# Patient Record
Sex: MALE | URBAN - METROPOLITAN AREA
[De-identification: names, ages, dates, MRNs, and addresses within clinical notes are randomized per-mention and may not be internally consistent; named-entity substitution may affect disease eponyms.]

---

## 2024-04-25 ENCOUNTER — EMERGENCY (EMERGENCY)
Facility: HOSPITAL | Age: 54
LOS: 1 days | Discharge: HOME | End: 2024-04-25
Admitting: EMERGENCY MEDICINE
Payer: COMMERCIAL

## 2024-04-25 VITALS
RESPIRATION RATE: 18 BRPM | SYSTOLIC BLOOD PRESSURE: 158 MMHG | HEART RATE: 97 BPM | TEMPERATURE: 98 F | WEIGHT: 205.91 LBS | OXYGEN SATURATION: 98 % | DIASTOLIC BLOOD PRESSURE: 107 MMHG
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
## 2024-04-25 DIAGNOSIS — M54.2 CERVICALGIA: ICD-10-CM

## 2024-04-25 PROCEDURE — 96372 THER/PROPH/DIAG INJ SC/IM: CPT

## 2024-04-25 PROCEDURE — 99284 EMERGENCY DEPT VISIT MOD MDM: CPT

## 2024-04-25 PROCEDURE — 72125 CT NECK SPINE W/O DYE: CPT | Mod: MC

## 2024-04-25 PROCEDURE — 70450 CT HEAD/BRAIN W/O DYE: CPT | Mod: 26,MC

## 2024-04-25 PROCEDURE — 72125 CT NECK SPINE W/O DYE: CPT | Mod: 26,MC

## 2024-04-25 PROCEDURE — 99284 EMERGENCY DEPT VISIT MOD MDM: CPT | Mod: 25

## 2024-04-25 PROCEDURE — 70450 CT HEAD/BRAIN W/O DYE: CPT | Mod: MC

## 2024-04-25 RX ORDER — LIDOCAINE 4 G/100G
1 CREAM TOPICAL
Qty: 7 | Refills: 0
Start: 2024-04-25 | End: 2024-05-01

## 2024-04-25 RX ORDER — CYCLOBENZAPRINE HYDROCHLORIDE 10 MG/1
1 TABLET, FILM COATED ORAL
Qty: 15 | Refills: 0
Start: 2024-04-25 | End: 2024-04-29

## 2024-04-25 RX ORDER — CYCLOBENZAPRINE HYDROCHLORIDE 10 MG/1
10 TABLET, FILM COATED ORAL ONCE
Refills: 0 | Status: COMPLETED | OUTPATIENT
Start: 2024-04-25 | End: 2024-04-25

## 2024-04-25 RX ORDER — KETOROLAC TROMETHAMINE 30 MG/ML
30 SYRINGE (ML) INJECTION ONCE
Refills: 0 | Status: DISCONTINUED | OUTPATIENT
Start: 2024-04-25 | End: 2024-04-25

## 2024-04-25 RX ORDER — IBUPROFEN 200 MG
1 TABLET ORAL
Qty: 15 | Refills: 0
Start: 2024-04-25 | End: 2024-04-29

## 2024-04-25 RX ADMIN — CYCLOBENZAPRINE HYDROCHLORIDE 10 MILLIGRAM(S): 10 TABLET, FILM COATED ORAL at 16:47

## 2024-04-25 RX ADMIN — Medication 30 MILLIGRAM(S): at 16:47

## 2024-04-25 NOTE — ED PROVIDER NOTE - CARE PROVIDERS DIRECT ADDRESSES
[email or web address]

## 2024-04-25 NOTE — ED PROVIDER NOTE - CPE EDP EYES NORM
[facility]  [address]  [facility] [address]  Phone: [phone]           Patient Discharge Instructions     Our goal is to set you up for success. This packet includes information on your condition, medications, and your home care. It will help you to care for yourself so you don't get sicker and need to go back to the hospital.     Please ask your nurse if you have any questions.        There are many details to remember when preparing to leave the hospital. Here is what you will need to do:    1. Take your medicine. If you are prescribed medications, review your Medication List in the following pages. You may have new medications to  at the pharmacy and others that you'll need to stop taking. Review the instructions for how and when to take your medications. Talk with your doctor or nurses if you are unsure of what to do.     2. Go to your follow-up appointments. Specific follow-up information is listed in the following pages. Your may be contacted by a transition nurse or clinical provider about future appointments. Be sure we have all of the phone numbers to reach you, if needed. Please contact your provider's office if you are unable to make an appointment.     3. Watch for warning signs. Your doctor or nurse will give you detailed warning signs to watch for and when to call for assistance. These instructions may also include educational information about your condition. If you experience any of warning signs to your health, call your doctor.               [facility]  Unless otherwise directed by your provider, please contact [facility], our nurse care line that is available for 24/7 assistance.     [phone] (toll-free)    Registered nurses in the [facility] provide clinical advisement, health education, appointment booking, and other advisory services.                    ** Verify the list of medication(s) below is accurate and up  to date. Carry this with you in case of emergency. If your medications have changed, please notify your healthcare provider.             Medication List      START taking these medications        Additional Info    Begin Date AM Noon PM Bedtime    furosemide 20 MG tablet   Commonly known as:  LASIX   Quantity:  30 tablet   Refills:  2   Dose:  20 mg    Instructions:  Take 1 tablet (20 mg total) by mouth once daily.                               levoFLOXacin 750 MG tablet   Commonly known as:  LEVAQUIN   Quantity:  3 tablet   Refills:  0   Dose:  750 mg    Instructions:  Take 1 tablet (750 mg total) by mouth once daily.                               ondansetron 4 MG tablet   Commonly known as:  ZOFRAN   Quantity:  10 tablet   Refills:  0   Dose:  4 mg    Instructions:  Take 1 tablet (4 mg total) by mouth every 8 (eight) hours as needed for Nausea.                              CONTINUE taking these medications        Additional Info    Begin Date AM Noon PM Bedtime    acetaminophen 325 MG tablet   Commonly known as:  TYLENOL   Refills:  0   Dose:  650 mg    Last time this was given:  650 mg on 2/1/2017  7:48 PM   Instructions:  Take 2 tablets (650 mg total) by mouth every 6 (six) hours as needed for Pain.                            aspirin 81 MG EC tablet   Commonly known as:  ECOTRIN   Refills:  0   Dose:  81 mg    Last time this was given:  81 mg on 2/3/2017  8:28 AM   Instructions:  Take 81 mg by mouth once daily.                               atorvastatin 40 MG tablet   Commonly known as:  LIPITOR   Quantity:  90 tablet   Refills:  3   Dose:  40 mg    Last time this was given:  40 mg on 2/3/2017  8:27 AM   Instructions:  Take 1 tablet (40 mg total) by mouth once daily.                               calcium citrate-vitamin D3 315-200 mg 315-200 mg-unit per tablet   Commonly known as:  CITRACAL+D   Refills:  0   Dose:  1 tablet    Instructions:  Take 1 tablet by mouth once daily.                                COCONUT OIL ORAL   Refills:  0    Instructions:  Take by mouth.                            estrogens (conjugated) 0.3 MG tablet   Commonly known as:  PREMARIN   Refills:  0   Dose:  0.3 mg    Instructions:  Take 0.3 mg by mouth once daily.                               fish oil-omega-3 fatty acids 300-1,000 mg capsule   Refills:  0   Dose:  2 g    Instructions:  Take 2 g by mouth once daily.                               GINSENG ORAL   Refills:  0    Instructions:  Take by mouth.                            levothyroxine 50 MCG tablet   Commonly known as:  SYNTHROID   Quantity:  90 tablet   Refills:  1   Dose:  50 mcg    Last time this was given:  50 mcg on 2/3/2017  5:58 AM   Instructions:  Take 1 tablet (50 mcg total) by mouth before breakfast.                               losartan 25 MG tablet   Commonly known as:  COZAAR   Quantity:  30 tablet   Refills:  3   Dose:  12.5 mg    Last time this was given:  12.5 mg on 2/3/2017  8:28 AM   Instructions:  Take 0.5 tablets (12.5 mg total) by mouth once daily. Hold for SBP < 110.                               omeprazole 20 MG capsule   Commonly known as:  PRILOSEC   Refills:  0   Dose:  20 mg    Instructions:  Take 20 mg by mouth once daily.                               senna-docusate 8.6-50 mg 8.6-50 mg per tablet   Commonly known as:  PERICOLACE   Refills:  0   Dose:  2 tablet    Instructions:  Take 2 tablets by mouth 2 (two) times daily.                            vitamin D 1000 units Tab   Refills:  0   Dose:  185 mg    Instructions:  Take 185 mg by mouth once daily.                               ZINC ACETATE ORAL   Refills:  0    Instructions:  Take by mouth.                                 Where to Get Your Medications      These medications were sent to [facility] - [place], [place] - [address]  [address], [facility] [address]     Phone:  [phone]     furosemide 20 MG tablet    levoFLOXacin 750 MG tablet    ondansetron 4 MG  "tablet                  Please bring to all follow up appointments:    1. A copy of your discharge instructions.  2. All medicines you are currently taking in their original bottles.  3. Identification and insurance card.    Please arrive 15 minutes ahead of scheduled appointment time.    Please call 24 hours in advance if you must reschedule your appointment and/or time.        Your Scheduled Appointments     Mar 14, 2017  1:40 PM CDT   Consult with MD Vandana Reyes - Dermatology ([place])    [address] [facility]  [place] [address] [phone]            Apr 05, 2017  2:00 PM CDT   Neurology - Established Patient with Micky Palma MD   [facility] - Neurology ([facility])    [address]  [facility] [address] [phone]            Apr 19, 2017  1:00 PM CDT   Fasting Lab with LAB, APPOINTMENT [place] [facility])    [address] [id number] [phone]            Apr 26, 2017  1:40 PM CDT   Established Patient Visit with MD Vandana Michel - Internal Medicine ([place])    [address] [facility]  [place] [address] [phone]                Discharge Instructions     Future Orders    Activity as tolerated     Call MD for:  difficulty breathing or increased cough     Diet Cardiac         Primary Diagnosis     Your primary diagnosis was:  Acute Lung Edema      Admission Information     Date & Time Provider Department [facility]    2/1/2017  4:31 PM Shena Lord MD [facility] [phone]      Care Providers     Provider Role Specialty Primary office phone    Shena Lord MD Attending Provider Hospitalist [phone]    Shena Lord MD Team Attending  Hospitalist [phone]      Your Vitals Were     BP Pulse Temp Resp Height Weight    110/57 (BP Method: cNIBP) 62 97.6 °F (36.4 °C) (Oral) 14 5' 3" (1.6 m) 62.4 kg (137 lb 9.1 oz)    Last Period SpO2 BMI          " (LMP Unknown) 94% 24.37 kg/m2        Recent Lab Values        8/11/2009 3/24/2016                       12:20 PM 12:50 PM          A1C 6.0 5.7                     Pending Labs     Order Current Status    Blood culture #1 **CANNOT BE ORDERED STAT** Preliminary result    Blood culture #2 **CANNOT BE ORDERED STAT** Preliminary result      Allergies as of 2/3/2017        Reactions    Codeine Nausea And Vomiting    chills    Lisinopril Other (See Comments)    cough      Advance Directives     An advance directive is a document which, in the event you are no longer able to make decisions for yourself, tells your healthcare team what kind of treatment you do or do not want to receive, or who you would like to make those decisions for you.  If you do not currently have an advance directive, [facility] encourages you to create one.  For more information call:  [phone]), [phone] ([phone]),  or log on to [email or web address].        Language Assistance Services     ATTENTION: Language assistance services are available, free of charge. Please call [phone].      ATENCIÓN: Si habla español, tiene a peñaloza disposición servicios gratuitos de asistencia lingüística. Llame al [phone].     CHÚ Ý: N?u b?n nói Ti?ng Vi?t, có các d?ch v? h? tr? ngôn ng? mi?n phí dành cho b?n. G?i s? [phone].        Pneumonmia Discharge Instructions                 [facility] complies with applicable Federal civil rights laws and does not discriminate on the basis of race, color, national origin, age, disability, or sex.         normal...
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
## 2024-04-25 NOTE — ED PROVIDER NOTE - CONSTITUTIONAL MOOD
Anesthesia Evaluation     Patient summary reviewed and Nursing notes reviewed   history of anesthetic complications:  PONV  NPO Solid Status: > 8 hours  NPO Liquid Status: > 2 hours           Airway   Mallampati: II  TM distance: >3 FB  Neck ROM: full  Possible difficult intubation  Dental    (+) implants        Pulmonary - normal exam   (+) a smoker Former,sleep apnea  Cardiovascular - normal exam    ECG reviewed    (+) hyperlipidemia      Neuro/Psych  (+) psychiatric history Anxiety, Depression and Bipolar  GI/Hepatic/Renal/Endo    (+) obesity, GERD poorly controlled, PUD, thyroid problem hypothyroidism    Musculoskeletal     Abdominal   (+) obese   Substance History      OB/GYN          Other   arthritis,   history of cancer (cervical, breast)        Phys Exam Other: Previous grade IIa view              Anesthesia Plan    ASA 2     general     (With ISB for POPC PSR. PONV prophylaxis, 3+ risk factors    I have reviewed the patient's history and chart with the patient, including all pertinent laboratory results and imaging. I have explained the risks of anesthesia including but not limited to dental damage, corneal abrasion, nerve injury, MI, stroke, aspiration, and death. Patient has agreed to proceed.      /82 (BP Location: Left arm, Patient Position: Sitting)   Pulse 86   Temp 36.3 °C (97.4 °F) (Oral)   Resp 16   SpO2 97%   )  intravenous induction     Anesthetic plan, risks, benefits, and alternatives have been provided, discussed and informed consent has been obtained with: patient.    CODE STATUS:         
appropriate

## 2024-04-25 NOTE — ED PROVIDER NOTE - PROVIDER TOKENS
PROVIDER:[TOKEN:[[id number]]],PROVIDER:[TOKEN:[[id number]]],PROVIDER:[TOKEN:[[id number]]]
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
## 2024-04-25 NOTE — ED PROVIDER NOTE - MUSCULOSKELETAL, MLM
no C/T/LS spine tend, FROM, UE w/FROM b/l, + light touch b/l, muscle strength 5/5 b/l, good resistance b/l, no U/M/R nerve deficits b/l, radial pulses 2+ b/l

## 2024-04-25 NOTE — ED ADULT NURSE NOTE - NSFALLUNIVINTERV_ED_ALL_ED
Bed/Stretcher in lowest position, wheels locked, appropriate side rails in place/Call bell, personal items and telephone in reach/Instruct patient to call for assistance before getting out of bed/chair/stretcher/Non-slip footwear applied when patient is off stretcher/[place] to call system/Physically safe environment - no spills, clutter or unnecessary equipment/Purposeful proactive rounding/Room/bathroom lighting operational, light cord in reach

## 2024-05-09 PROBLEM — Z00.00 ENCOUNTER FOR PREVENTIVE HEALTH EXAMINATION: Status: ACTIVE | Noted: 2024-05-09

## 2024-05-28 ENCOUNTER — TRANSCRIPTION ENCOUNTER (OUTPATIENT)
Age: 54
End: 2024-05-28

## 2024-10-11 ENCOUNTER — APPOINTMENT (OUTPATIENT)
Dept: NEUROSURGERY | Facility: CLINIC | Age: 54
End: 2024-10-11
Payer: COMMERCIAL

## 2024-10-11 ENCOUNTER — APPOINTMENT (OUTPATIENT)
Dept: PAIN MANAGEMENT | Facility: CLINIC | Age: 54
End: 2024-10-11

## 2024-10-11 VITALS
RESPIRATION RATE: 18 BRPM | DIASTOLIC BLOOD PRESSURE: 90 MMHG | WEIGHT: 224 LBS | BODY MASS INDEX: 30.34 KG/M2 | HEIGHT: 72 IN | OXYGEN SATURATION: 98 % | HEART RATE: 89 BPM | SYSTOLIC BLOOD PRESSURE: 129 MMHG

## 2024-10-11 VITALS
BODY MASS INDEX: 30.34 KG/M2 | HEART RATE: 81 BPM | OXYGEN SATURATION: 98 % | SYSTOLIC BLOOD PRESSURE: 139 MMHG | HEIGHT: 72 IN | DIASTOLIC BLOOD PRESSURE: 99 MMHG | WEIGHT: 224 LBS

## 2024-10-11 DIAGNOSIS — J45.909 UNSPECIFIED ASTHMA, UNCOMPLICATED: ICD-10-CM

## 2024-10-11 DIAGNOSIS — M47.22 OTHER SPONDYLOSIS WITH RADICULOPATHY, CERVICAL REGION: ICD-10-CM

## 2024-10-11 DIAGNOSIS — M48.02 SPINAL STENOSIS, CERVICAL REGION: ICD-10-CM

## 2024-10-11 DIAGNOSIS — G56.00 CARPAL TUNNEL SYNDROME, UNSPECIFIED UPPER LIMB: ICD-10-CM

## 2024-10-11 PROCEDURE — 64490 INJ PARAVERT F JNT C/T 1 LEV: CPT | Mod: LT

## 2024-10-11 PROCEDURE — 64491 INJ PARAVERT F JNT C/T 2 LEV: CPT | Mod: LT

## 2024-10-11 PROCEDURE — 99205 OFFICE O/P NEW HI 60 MIN: CPT

## 2024-10-11 PROCEDURE — 99205 OFFICE O/P NEW HI 60 MIN: CPT | Mod: 25

## 2024-11-05 ENCOUNTER — NON-APPOINTMENT (OUTPATIENT)
Age: 54
End: 2024-11-05